# Patient Record
Sex: FEMALE | Race: WHITE | NOT HISPANIC OR LATINO | Employment: FULL TIME | URBAN - NONMETROPOLITAN AREA
[De-identification: names, ages, dates, MRNs, and addresses within clinical notes are randomized per-mention and may not be internally consistent; named-entity substitution may affect disease eponyms.]

---

## 2022-01-05 ENCOUNTER — HOSPITAL ENCOUNTER (EMERGENCY)
Facility: HOSPITAL | Age: 42
End: 2022-01-05

## 2022-01-05 ENCOUNTER — HOSPITAL ENCOUNTER (OUTPATIENT)
Facility: HOSPITAL | Age: 42
Discharge: HOME | End: 2022-01-06
Attending: OTOLARYNGOLOGY | Admitting: OTOLARYNGOLOGY

## 2022-01-05 ENCOUNTER — ANESTHESIA (OUTPATIENT)
Dept: PERIOP | Facility: HOSPITAL | Age: 42
End: 2022-01-05

## 2022-01-05 ENCOUNTER — ANESTHESIA EVENT (OUTPATIENT)
Dept: PERIOP | Facility: HOSPITAL | Age: 42
End: 2022-01-05

## 2022-01-05 ENCOUNTER — OFFICE VISIT (OUTPATIENT)
Dept: OTOLARYNGOLOGY | Facility: CLINIC | Age: 42
End: 2022-01-05

## 2022-01-05 VITALS
DIASTOLIC BLOOD PRESSURE: 88 MMHG | SYSTOLIC BLOOD PRESSURE: 162 MMHG | TEMPERATURE: 98.2 F | WEIGHT: 144 LBS | HEART RATE: 112 BPM

## 2022-01-05 DIAGNOSIS — J03.90 ACUTE TONSILLITIS, UNSPECIFIED ETIOLOGY: ICD-10-CM

## 2022-01-05 DIAGNOSIS — Z90.89 S/P TONSILLECTOMY: Primary | ICD-10-CM

## 2022-01-05 DIAGNOSIS — J98.8 ANATOMIC AIRWAY OBSTRUCTION: ICD-10-CM

## 2022-01-05 DIAGNOSIS — J36 PERITONSILLAR ABSCESS: Primary | ICD-10-CM

## 2022-01-05 DIAGNOSIS — Z87.891 PERSONAL HISTORY OF NICOTINE DEPENDENCE: ICD-10-CM

## 2022-01-05 DIAGNOSIS — J36 PERITONSILLAR ABSCESS: ICD-10-CM

## 2022-01-05 LAB
B-HCG UR QL: NEGATIVE
GLUCOSE BLDC GLUCOMTR-MCNC: 204 MG/DL (ref 70–130)
GLUCOSE BLDC GLUCOMTR-MCNC: 235 MG/DL (ref 70–130)
GLUCOSE BLDC GLUCOMTR-MCNC: 248 MG/DL (ref 70–130)

## 2022-01-05 PROCEDURE — 25010000002 DEXAMETHASONE PER 1 MG: Performed by: NURSE ANESTHETIST, CERTIFIED REGISTERED

## 2022-01-05 PROCEDURE — 82962 GLUCOSE BLOOD TEST: CPT

## 2022-01-05 PROCEDURE — 25010000002 DEXAMETHASONE PER 1 MG: Performed by: OTOLARYNGOLOGY

## 2022-01-05 PROCEDURE — 99234 HOSP IP/OBS SM DT SF/LOW 45: CPT | Performed by: OTOLARYNGOLOGY

## 2022-01-05 PROCEDURE — 88304 TISSUE EXAM BY PATHOLOGIST: CPT | Performed by: OTOLARYNGOLOGY

## 2022-01-05 PROCEDURE — G0378 HOSPITAL OBSERVATION PER HR: HCPCS

## 2022-01-05 PROCEDURE — 42821 REMOVE TONSILS AND ADENOIDS: CPT | Performed by: OTOLARYNGOLOGY

## 2022-01-05 PROCEDURE — 81025 URINE PREGNANCY TEST: CPT | Performed by: OTOLARYNGOLOGY

## 2022-01-05 PROCEDURE — 87070 CULTURE OTHR SPECIMN AEROBIC: CPT | Performed by: OTOLARYNGOLOGY

## 2022-01-05 PROCEDURE — 87147 CULTURE TYPE IMMUNOLOGIC: CPT | Performed by: OTOLARYNGOLOGY

## 2022-01-05 PROCEDURE — 42700 I&D ABSCESS PERITONSILLAR: CPT | Performed by: OTOLARYNGOLOGY

## 2022-01-05 PROCEDURE — 25010000002 ONDANSETRON PER 1 MG: Performed by: NURSE ANESTHETIST, CERTIFIED REGISTERED

## 2022-01-05 PROCEDURE — 0 CEFAZOLIN PER 500 MG: Performed by: NURSE ANESTHETIST, CERTIFIED REGISTERED

## 2022-01-05 PROCEDURE — 25010000002 PROPOFOL 10 MG/ML EMULSION: Performed by: NURSE ANESTHETIST, CERTIFIED REGISTERED

## 2022-01-05 PROCEDURE — 25010000002 FENTANYL CITRATE (PF) 250 MCG/5ML SOLUTION: Performed by: NURSE ANESTHETIST, CERTIFIED REGISTERED

## 2022-01-05 PROCEDURE — 87205 SMEAR GRAM STAIN: CPT | Performed by: OTOLARYNGOLOGY

## 2022-01-05 RX ORDER — ONDANSETRON 2 MG/ML
4 INJECTION INTRAMUSCULAR; INTRAVENOUS ONCE AS NEEDED
Status: DISCONTINUED | OUTPATIENT
Start: 2022-01-05 | End: 2022-01-06 | Stop reason: HOSPADM

## 2022-01-05 RX ORDER — IBUPROFEN 600 MG/1
600 TABLET ORAL ONCE AS NEEDED
Status: DISCONTINUED | OUTPATIENT
Start: 2022-01-05 | End: 2022-01-05 | Stop reason: HOSPADM

## 2022-01-05 RX ORDER — ECHINACEA PURPUREA EXTRACT 125 MG
1 TABLET ORAL AS NEEDED
COMMUNITY

## 2022-01-05 RX ORDER — SODIUM CHLORIDE 0.9 % (FLUSH) 0.9 %
10 SYRINGE (ML) INJECTION AS NEEDED
Status: DISCONTINUED | OUTPATIENT
Start: 2022-01-05 | End: 2022-01-05 | Stop reason: HOSPADM

## 2022-01-05 RX ORDER — ECHINACEA PURPUREA EXTRACT 125 MG
2 TABLET ORAL CONTINUOUS PRN
Status: DISCONTINUED | OUTPATIENT
Start: 2022-01-05 | End: 2022-01-06 | Stop reason: HOSPADM

## 2022-01-05 RX ORDER — CLINDAMYCIN PHOSPHATE 600 MG/50ML
600 INJECTION INTRAVENOUS EVERY 8 HOURS
Status: DISCONTINUED | OUTPATIENT
Start: 2022-01-05 | End: 2022-01-06 | Stop reason: HOSPADM

## 2022-01-05 RX ORDER — PROPOFOL 10 MG/ML
VIAL (ML) INTRAVENOUS AS NEEDED
Status: DISCONTINUED | OUTPATIENT
Start: 2022-01-05 | End: 2022-01-05 | Stop reason: SURG

## 2022-01-05 RX ORDER — NALOXONE HCL 0.4 MG/ML
0.04 VIAL (ML) INJECTION AS NEEDED
Status: DISCONTINUED | OUTPATIENT
Start: 2022-01-05 | End: 2022-01-05 | Stop reason: HOSPADM

## 2022-01-05 RX ORDER — OXYMETAZOLINE HYDROCHLORIDE 0.05 G/100ML
2 SPRAY NASAL 3 TIMES DAILY
Status: DISCONTINUED | OUTPATIENT
Start: 2022-01-05 | End: 2022-01-06 | Stop reason: HOSPADM

## 2022-01-05 RX ORDER — DEXTROSE, SODIUM CHLORIDE, AND POTASSIUM CHLORIDE 5; .45; .15 G/100ML; G/100ML; G/100ML
100 INJECTION INTRAVENOUS CONTINUOUS
Status: DISCONTINUED | OUTPATIENT
Start: 2022-01-05 | End: 2022-01-06 | Stop reason: HOSPADM

## 2022-01-05 RX ORDER — FLUMAZENIL 0.1 MG/ML
0.2 INJECTION INTRAVENOUS AS NEEDED
Status: DISCONTINUED | OUTPATIENT
Start: 2022-01-05 | End: 2022-01-05 | Stop reason: HOSPADM

## 2022-01-05 RX ORDER — SODIUM CHLORIDE, SODIUM LACTATE, POTASSIUM CHLORIDE, CALCIUM CHLORIDE 600; 310; 30; 20 MG/100ML; MG/100ML; MG/100ML; MG/100ML
9 INJECTION, SOLUTION INTRAVENOUS CONTINUOUS
Status: DISCONTINUED | OUTPATIENT
Start: 2022-01-05 | End: 2022-01-05 | Stop reason: HOSPADM

## 2022-01-05 RX ORDER — MIDAZOLAM HYDROCHLORIDE 1 MG/ML
1 INJECTION INTRAMUSCULAR; INTRAVENOUS
Status: DISCONTINUED | OUTPATIENT
Start: 2022-01-05 | End: 2022-01-05 | Stop reason: HOSPADM

## 2022-01-05 RX ORDER — METOPROLOL TARTRATE 5 MG/5ML
INJECTION INTRAVENOUS AS NEEDED
Status: DISCONTINUED | OUTPATIENT
Start: 2022-01-05 | End: 2022-01-05 | Stop reason: SURG

## 2022-01-05 RX ORDER — ONDANSETRON 2 MG/ML
INJECTION INTRAMUSCULAR; INTRAVENOUS AS NEEDED
Status: DISCONTINUED | OUTPATIENT
Start: 2022-01-05 | End: 2022-01-05 | Stop reason: SURG

## 2022-01-05 RX ORDER — OXYCODONE HCL 5 MG/5 ML
5 SOLUTION, ORAL ORAL
Status: DISCONTINUED | OUTPATIENT
Start: 2022-01-05 | End: 2022-01-06 | Stop reason: HOSPADM

## 2022-01-05 RX ORDER — LIDOCAINE HYDROCHLORIDE 10 MG/ML
0.5 INJECTION, SOLUTION EPIDURAL; INFILTRATION; INTRACAUDAL; PERINEURAL ONCE AS NEEDED
Status: DISCONTINUED | OUTPATIENT
Start: 2022-01-05 | End: 2022-01-05 | Stop reason: HOSPADM

## 2022-01-05 RX ORDER — LABETALOL HYDROCHLORIDE 5 MG/ML
5 INJECTION, SOLUTION INTRAVENOUS
Status: DISCONTINUED | OUTPATIENT
Start: 2022-01-05 | End: 2022-01-05 | Stop reason: HOSPADM

## 2022-01-05 RX ORDER — SODIUM CHLORIDE 0.9 % (FLUSH) 0.9 %
3 SYRINGE (ML) INJECTION AS NEEDED
Status: DISCONTINUED | OUTPATIENT
Start: 2022-01-05 | End: 2022-01-05 | Stop reason: HOSPADM

## 2022-01-05 RX ORDER — NEOSTIGMINE METHYLSULFATE 5 MG/5 ML
SYRINGE (ML) INTRAVENOUS AS NEEDED
Status: DISCONTINUED | OUTPATIENT
Start: 2022-01-05 | End: 2022-01-05 | Stop reason: SURG

## 2022-01-05 RX ORDER — DEXTROSE MONOHYDRATE 25 G/50ML
12.5 INJECTION, SOLUTION INTRAVENOUS AS NEEDED
Status: DISCONTINUED | OUTPATIENT
Start: 2022-01-05 | End: 2022-01-05 | Stop reason: HOSPADM

## 2022-01-05 RX ORDER — MORPHINE SULFATE 2 MG/ML
1 INJECTION, SOLUTION INTRAMUSCULAR; INTRAVENOUS
Status: DISCONTINUED | OUTPATIENT
Start: 2022-01-05 | End: 2022-01-06 | Stop reason: HOSPADM

## 2022-01-05 RX ORDER — ACETAMINOPHEN 325 MG/1
325 TABLET ORAL EVERY 4 HOURS PRN
Status: DISCONTINUED | OUTPATIENT
Start: 2022-01-05 | End: 2022-01-06 | Stop reason: HOSPADM

## 2022-01-05 RX ORDER — FENTANYL CITRATE 50 UG/ML
INJECTION, SOLUTION INTRAMUSCULAR; INTRAVENOUS AS NEEDED
Status: DISCONTINUED | OUTPATIENT
Start: 2022-01-05 | End: 2022-01-05

## 2022-01-05 RX ORDER — SODIUM CHLORIDE 9 MG/ML
INJECTION, SOLUTION INTRAVENOUS AS NEEDED
Status: DISCONTINUED | OUTPATIENT
Start: 2022-01-05 | End: 2022-01-05 | Stop reason: HOSPADM

## 2022-01-05 RX ORDER — FENTANYL CITRATE 50 UG/ML
25 INJECTION, SOLUTION INTRAMUSCULAR; INTRAVENOUS
Status: DISCONTINUED | OUTPATIENT
Start: 2022-01-05 | End: 2022-01-05 | Stop reason: HOSPADM

## 2022-01-05 RX ORDER — CEFAZOLIN SODIUM 1 G/3ML
INJECTION, POWDER, FOR SOLUTION INTRAMUSCULAR; INTRAVENOUS AS NEEDED
Status: DISCONTINUED | OUTPATIENT
Start: 2022-01-05 | End: 2022-01-05 | Stop reason: SURG

## 2022-01-05 RX ORDER — LIDOCAINE HYDROCHLORIDE 40 MG/ML
SOLUTION TOPICAL AS NEEDED
Status: DISCONTINUED | OUTPATIENT
Start: 2022-01-05 | End: 2022-01-05 | Stop reason: SURG

## 2022-01-05 RX ORDER — ACETAMINOPHEN 160 MG/5ML
650 SOLUTION ORAL EVERY 4 HOURS PRN
Status: DISCONTINUED | OUTPATIENT
Start: 2022-01-05 | End: 2022-01-06 | Stop reason: HOSPADM

## 2022-01-05 RX ORDER — DEXAMETHASONE SODIUM PHOSPHATE 4 MG/ML
8 INJECTION, SOLUTION INTRA-ARTICULAR; INTRALESIONAL; INTRAMUSCULAR; INTRAVENOUS; SOFT TISSUE EVERY 6 HOURS
Status: DISCONTINUED | OUTPATIENT
Start: 2022-01-05 | End: 2022-01-06 | Stop reason: HOSPADM

## 2022-01-05 RX ORDER — SODIUM CHLORIDE 0.9 % (FLUSH) 0.9 %
10 SYRINGE (ML) INJECTION EVERY 12 HOURS SCHEDULED
Status: DISCONTINUED | OUTPATIENT
Start: 2022-01-05 | End: 2022-01-05 | Stop reason: HOSPADM

## 2022-01-05 RX ORDER — OXYCODONE AND ACETAMINOPHEN 10; 325 MG/1; MG/1
1 TABLET ORAL ONCE AS NEEDED
Status: DISCONTINUED | OUTPATIENT
Start: 2022-01-05 | End: 2022-01-05 | Stop reason: HOSPADM

## 2022-01-05 RX ORDER — VECURONIUM BROMIDE 1 MG/ML
INJECTION, POWDER, LYOPHILIZED, FOR SOLUTION INTRAVENOUS AS NEEDED
Status: DISCONTINUED | OUTPATIENT
Start: 2022-01-05 | End: 2022-01-05 | Stop reason: SURG

## 2022-01-05 RX ORDER — SODIUM CHLORIDE, SODIUM LACTATE, POTASSIUM CHLORIDE, CALCIUM CHLORIDE 600; 310; 30; 20 MG/100ML; MG/100ML; MG/100ML; MG/100ML
1000 INJECTION, SOLUTION INTRAVENOUS CONTINUOUS
Status: DISCONTINUED | OUTPATIENT
Start: 2022-01-05 | End: 2022-01-05 | Stop reason: HOSPADM

## 2022-01-05 RX ORDER — D-METHORPHAN/PE/ACETAMINOPHEN 10-5-325MG
CAPSULE ORAL
COMMUNITY

## 2022-01-05 RX ORDER — HYDROMORPHONE HYDROCHLORIDE 1 MG/ML
0.5 INJECTION, SOLUTION INTRAMUSCULAR; INTRAVENOUS; SUBCUTANEOUS
Status: DISCONTINUED | OUTPATIENT
Start: 2022-01-05 | End: 2022-01-05 | Stop reason: HOSPADM

## 2022-01-05 RX ORDER — DEXAMETHASONE SODIUM PHOSPHATE 4 MG/ML
INJECTION, SOLUTION INTRA-ARTICULAR; INTRALESIONAL; INTRAMUSCULAR; INTRAVENOUS; SOFT TISSUE AS NEEDED
Status: DISCONTINUED | OUTPATIENT
Start: 2022-01-05 | End: 2022-01-05 | Stop reason: SURG

## 2022-01-05 RX ORDER — LIDOCAINE HYDROCHLORIDE 20 MG/ML
INJECTION, SOLUTION EPIDURAL; INFILTRATION; INTRACAUDAL; PERINEURAL AS NEEDED
Status: DISCONTINUED | OUTPATIENT
Start: 2022-01-05 | End: 2022-01-05 | Stop reason: SURG

## 2022-01-05 RX ORDER — ACETAMINOPHEN 650 MG/1
650 SUPPOSITORY RECTAL EVERY 4 HOURS PRN
Status: DISCONTINUED | OUTPATIENT
Start: 2022-01-05 | End: 2022-01-06 | Stop reason: HOSPADM

## 2022-01-05 RX ORDER — ECHINACEA PURPUREA EXTRACT 125 MG
2 TABLET ORAL
Status: DISCONTINUED | OUTPATIENT
Start: 2022-01-05 | End: 2022-01-06 | Stop reason: HOSPADM

## 2022-01-05 RX ORDER — ONDANSETRON 2 MG/ML
4 INJECTION INTRAMUSCULAR; INTRAVENOUS AS NEEDED
Status: DISCONTINUED | OUTPATIENT
Start: 2022-01-05 | End: 2022-01-05 | Stop reason: HOSPADM

## 2022-01-05 RX ORDER — NAPROXEN SODIUM 220 MG
220 TABLET ORAL 2 TIMES DAILY PRN
COMMUNITY

## 2022-01-05 RX ORDER — FENTANYL CITRATE 50 UG/ML
INJECTION, SOLUTION INTRAMUSCULAR; INTRAVENOUS AS NEEDED
Status: DISCONTINUED | OUTPATIENT
Start: 2022-01-05 | End: 2022-01-05 | Stop reason: SURG

## 2022-01-05 RX ADMIN — POTASSIUM CHLORIDE, DEXTROSE MONOHYDRATE AND SODIUM CHLORIDE 100 ML/HR: 150; 5; 450 INJECTION, SOLUTION INTRAVENOUS at 20:30

## 2022-01-05 RX ADMIN — CLINDAMYCIN IN 5 PERCENT DEXTROSE 600 MG: 12 INJECTION, SOLUTION INTRAVENOUS at 20:38

## 2022-01-05 RX ADMIN — METOPROLOL TARTRATE 2.5 MG: 1 INJECTION, SOLUTION INTRAVENOUS at 16:51

## 2022-01-05 RX ADMIN — DEXAMETHASONE SODIUM PHOSPHATE 10 MG: 4 INJECTION, SOLUTION INTRA-ARTICULAR; INTRALESIONAL; INTRAMUSCULAR; INTRAVENOUS; SOFT TISSUE at 16:16

## 2022-01-05 RX ADMIN — CEFAZOLIN 2 G: 330 INJECTION, POWDER, FOR SOLUTION INTRAMUSCULAR; INTRAVENOUS at 16:15

## 2022-01-05 RX ADMIN — VECURONIUM BROMIDE 5 MG: 1 INJECTION, POWDER, LYOPHILIZED, FOR SOLUTION INTRAVENOUS at 16:13

## 2022-01-05 RX ADMIN — LIDOCAINE HYDROCHLORIDE 2 EACH: 40 SOLUTION TOPICAL at 16:13

## 2022-01-05 RX ADMIN — SODIUM CHLORIDE, POTASSIUM CHLORIDE, SODIUM LACTATE AND CALCIUM CHLORIDE 1000 ML: 600; 310; 30; 20 INJECTION, SOLUTION INTRAVENOUS at 15:35

## 2022-01-05 RX ADMIN — SALINE NASAL SPRAY 2 SPRAY: 1.5 SOLUTION NASAL at 21:18

## 2022-01-05 RX ADMIN — Medication 3.5 MG: at 16:54

## 2022-01-05 RX ADMIN — LIDOCAINE HYDROCHLORIDE 200 MG: 20 INJECTION, SOLUTION EPIDURAL; INFILTRATION; INTRACAUDAL; PERINEURAL at 16:13

## 2022-01-05 RX ADMIN — DEXAMETHASONE SODIUM PHOSPHATE 8 MG: 4 INJECTION, SOLUTION INTRA-ARTICULAR; INTRALESIONAL; INTRAMUSCULAR; INTRAVENOUS; SOFT TISSUE at 22:26

## 2022-01-05 RX ADMIN — GLYCOPYRROLATE 0.4 MG: 0.2 INJECTION, SOLUTION INTRAMUSCULAR; INTRAVENOUS at 16:54

## 2022-01-05 RX ADMIN — ONDANSETRON 8 MG: 2 INJECTION INTRAMUSCULAR; INTRAVENOUS at 16:16

## 2022-01-05 RX ADMIN — OXYMETAZOLINE HYDROCHLORIDE 2 SPRAY: 5 SPRAY NASAL at 21:18

## 2022-01-05 RX ADMIN — PROPOFOL 200 MG: 10 INJECTION, EMULSION INTRAVENOUS at 16:13

## 2022-01-05 RX ADMIN — FENTANYL CITRATE 250 MCG: 50 INJECTION, SOLUTION INTRAMUSCULAR; INTRAVENOUS at 16:13

## 2022-01-05 NOTE — BRIEF OP NOTE
[facility] Otolaryngology Head and Neck Surgery  OPERATIVE NOTE  Milagros Daniels  1/5/2022    Pre-op Diagnosis:   Peritonsillar abscess [J36]  Acute tonsillitis, unspecified etiology [J03.90]  Anatomic airway obstruction [J98.8]    Post-op Diagnosis:     Post-Op Diagnosis Codes:     * Peritonsillar abscess [J36]     * Acute tonsillitis, unspecified etiology [J03.90]     * Anatomic airway obstruction [J98.8]    Procedure/CPT® Codes:  Procedure(s):  PERITONSILLAR ABSCESS INCISION AND DRAINAGE  TONSILLECTOMY AND ADENOIDECTOMY    Surgeon(s):  Henry Austin Jr., MD    Anesthesia:   General    Staff:   Circulator: Andrew Tiwari RN  Scrub Person: Em Pantoja    Estimated Blood Loss:   10 cc    Specimens:                Tonsils     Drains:  none    Findings:   Tonsils: Left side 2+ right side 4+, Adenoids: 1+  Peritonsillar abscess present in the lower pole, infiltrating the lateral pharyngeal wall musculature    Complications:   none    Reason for the Operation:  Milagros Daniels is a 41 y.o. female who has had a history of peritonsillar abscess. A tonsillectomy and adenoidectomy were recommended. The risks and benefits were explained including but not limited to early and late bleeding, infection, risks of the general anesthesia, dysphagia and poor PO intake, and voice change/VPI.  Alternatives were discussed. Questions were asked appropriately answered.      Procedure Description:  Incision and drainage of peritonsillar abscess:  The patient was taken back to the operating room, placed supine on the operating table and placed under anesthesia by the anesthesia staff. Once this was done a time out was performed to confirm the patient and the proper procedure.  The head was positioned.  A Ethan-Angelo mouth gag inserted and opened to its widest extent.   Dissection was begun at right superior tonsillar pole, and extended inferiorly.  The tonsillar capsule was dissected but no  pus was identified.  The incision was carried down to the mid and inferior tonsillar pole.  The tonsil was reflected medially.  Posterior to the lower third of the tonsillar pole, abscess was encountered.  This was immediately cultured.  The entire abscess was irrigated and drained.  The palate was examined and no submucous cleft palate noted.  Tonsillectomy:  Medtronic Plasma Generator   Settings- 4/5   A red rubber catheter was placed through the nares and brought out through the mouth to retract the palate.  Using meticulous dissection in the capsular plane the left and right tonsils were removed. Hemostasis was obtained.   Final hemostasis was obtained with the Medtronic plasma generator.    The Angelo Ethan gag was released, allowed to stand and replaced. Hemostasis was found to be satisfactory.  The procedure was terminated.The adenoids were removed under indirect mirror visualization. Adequate hemostasis was assured prior to removing palate retraction.    The oropharynx and oral cavity were irrigated clean.  Hemostasis was satisfactory.     The operative site was inspected for retained foreign bodies and instruments.   Sponge/needle count was Correct  Hemostasis was satisfactory.  The patient was then turned over to the anesthesia team and allowed to wake from anesthesia.     Disposition: The patient was transported to the PACU in Good condition.   Patient will be placed in observation following surgery for additional care.    Postoperative discussion held with: No one present at end of surgery  DVT ASSESSMENT CARRIED OUT: Patient is in the immediate post-operative period and is not a candidate for anticoagulation therapy  Patient is at increased risk for bleeding if anticoagulant therapy is used    Henry Austin Jr, MD  1/5/2022  17:05 CST     Urine Voided: * No values recorded between 1/5/2022  4:11 PM and 1/5/2022  4:58 PM *    Specimens:                Specimens     ID Source Type Tests Collected By  Collected At Frozen?    1 Tonsil, Right Wound · WOUND CULTURE   Henry Austin Jr., MD 1/5/22 1632     Description: gram stain culture    A Tonsil, Right Tissue · TISSUE PATHOLOGY EXAM   Henry Austin Jr., MD 1/5/22 1640     Description: pin in right    This specimen was not marked as sent.    B Tonsil, Left Tissue · TISSUE PATHOLOGY EXAM   Henry Austin Jr., MD 1/5/22 1640     This specimen was not marked as sent.

## 2022-01-05 NOTE — ANESTHESIA POSTPROCEDURE EVALUATION
"Patient: Milagros Daniels    Procedure Summary     Date: 01/05/22 Room / Location:  [facility] OR 03 /  [facility] OR    Anesthesia Start: 1611 Anesthesia Stop: 1706    Procedures:       PERITONSILLAR ABSCESS INCISION AND DRAINAGE (Right Throat)      TONSILLECTOMY AND ADENOIDECTOMY (Bilateral Throat) Diagnosis:       Peritonsillar abscess      Acute tonsillitis, unspecified etiology      Anatomic airway obstruction      (Peritonsillar abscess [J36])      (Acute tonsillitis, unspecified etiology [J03.90])      (Anatomic airway obstruction [J98.8])    Surgeons: Henry Austin Jr., MD Provider: Roque Holly CRNA    Anesthesia Type: general ASA Status: 2 - Emergent          Anesthesia Type: general    Vitals  No vitals data found for the desired time range.          Post Anesthesia Care and Evaluation    Patient location during evaluation: PACU  Patient participation: complete - patient participated  Level of consciousness: awake and alert  Pain management: adequate  Airway patency: patent  Anesthetic complications: No anesthetic complications    Cardiovascular status: acceptable  Respiratory status: acceptable  Hydration status: acceptable    Comments: Blood pressure 145/83, pulse 116, temperature 100.1 °F (37.8 °C), temperature source Temporal, resp. rate 20, height 156 cm (61.42\"), weight 65.4 kg (144 lb 2.9 oz), last menstrual period 01/05/2022, SpO2 99 %, not currently breastfeeding.    Pt discharged from PACU based on abiel score >8      "

## 2022-01-05 NOTE — PATIENT INSTRUCTIONS
CONTACT INFORMATION:  The main office phone number is [phone]. For emergencies after hours and on weekends, this number will convert over to our answering service and the on call provider will answer. Please try to keep non emergent phone calls/ questions to office hours 9am-5pm Monday through Friday.     [employer]  As an alternative, you can sign up and use the Epic MyChart system for more direct and quicker access for non emergent questions/ problems.  [employer] allows you to send messages to your doctor, view your test results, renew your prescriptions, schedule appointments, and more. To sign up, go to [employer] and click on the Sign Up Now link in the New User? box. Enter your [employer] Activation Code exactly as it appears below along with the last four digits of your Social Security Number and your Date of Birth () to complete the sign-up process. If you do not sign up before the expiration date, you must request a new code.    [employer] Activation Code: [email or web address]  Expires: 2022 12:34 PM    If you have questions, you can email [email or web address] or call [phone] to talk to our [employer] staff. Remember, [employer] is NOT to be used for urgent needs. For medical emergencies, dial 911.    IF YOU SMOKE OR USE TOBACCO PLEASE READ THE FOLLOWING:  Why is smoking bad for me?  Smoking increases the risk of heart disease, lung disease, vascular disease, stroke, and cancer. If you smoke, STOP!      IF YOU SMOKE OR USE TOBACCO PLEASE READ THE FOLLOWING:  Why is smoking bad for me?  Smoking increases the risk of heart disease, lung disease, vascular disease, stroke, and cancer. If you smoke, STOP!    For more information:  [facility]  -QUIT-NOW  [email or web address]

## 2022-01-05 NOTE — OP NOTE
[facility] Otolaryngology Head and Neck Surgery  OPERATIVE NOTE  Milagros Daniels  1/5/2022    Pre-op Diagnosis:   Peritonsillar abscess [J36]  Acute tonsillitis, unspecified etiology [J03.90]  Anatomic airway obstruction [J98.8]    Post-op Diagnosis:     Post-Op Diagnosis Codes:     * Peritonsillar abscess [J36]     * Acute tonsillitis, unspecified etiology [J03.90]     * Anatomic airway obstruction [J98.8]    Procedure/CPT® Codes:  Procedure(s):  PERITONSILLAR ABSCESS INCISION AND DRAINAGE  TONSILLECTOMY AND ADENOIDECTOMY    Surgeon(s):  Henry Austin Jr., MD    Anesthesia:   General    Staff:   Circulator: Andrew Tiwari RN  Scrub Person: Em Pantoja    Estimated Blood Loss:   10 cc    Specimens:                Tonsils     Drains:  none    Findings:   Tonsils: Left side 2+ right side 4+, Adenoids: 1+  Peritonsillar abscess present in the lower pole, infiltrating the lateral pharyngeal wall musculature    Complications:   none    Reason for the Operation:  Milagros Daniels is a 41 y.o. female who has had a history of peritonsillar abscess. A tonsillectomy and adenoidectomy were recommended. The risks and benefits were explained including but not limited to early and late bleeding, infection, risks of the general anesthesia, dysphagia and poor PO intake, and voice change/VPI.  Alternatives were discussed. Questions were asked appropriately answered.      Procedure Description:  Incision and drainage of peritonsillar abscess:  The patient was taken back to the operating room, placed supine on the operating table and placed under anesthesia by the anesthesia staff. Once this was done a time out was performed to confirm the patient and the proper procedure.  The head was positioned.  A Ethan-Angelo mouth gag inserted and opened to its widest extent.   Dissection was begun at right superior tonsillar pole, and extended inferiorly.  The tonsillar capsule was dissected but no  pus was identified.  The incision was carried down to the mid and inferior tonsillar pole.  The tonsil was reflected medially.  Posterior to the lower third of the tonsillar pole, abscess was encountered.  This was immediately cultured.  The entire abscess was irrigated and drained.  The palate was examined and no submucous cleft palate noted.  Tonsillectomy:  Medtronic Plasma Generator   Settings- 4/5   A red rubber catheter was placed through the nares and brought out through the mouth to retract the palate.  Using meticulous dissection in the capsular plane the left and right tonsils were removed. Hemostasis was obtained.   Final hemostasis was obtained with the Medtronic plasma generator.    The Angelo Ethan gag was released, allowed to stand and replaced. Hemostasis was found to be satisfactory.  The procedure was terminated.The adenoids were removed under indirect mirror visualization. Adequate hemostasis was assured prior to removing palate retraction.    The oropharynx and oral cavity were irrigated clean.  Hemostasis was satisfactory.     The operative site was inspected for retained foreign bodies and instruments.   Sponge/needle count was Correct  Hemostasis was satisfactory.  The patient was then turned over to the anesthesia team and allowed to wake from anesthesia.     Disposition: The patient was transported to the PACU in Good condition.   Patient will be placed in observation following surgery for additional care.    Postoperative discussion held with: No one present at end of surgery  DVT ASSESSMENT CARRIED OUT: Patient is in the immediate post-operative period and is not a candidate for anticoagulation therapy  Patient is at increased risk for bleeding if anticoagulant therapy is used    Henry Austin Jr, MD  1/5/2022  17:05 CST     Urine Voided: * No values recorded between 1/5/2022  4:11 PM and 1/5/2022  4:58 PM *    Specimens:                Specimens     ID Source Type Tests Collected By  Collected At Frozen?    1 Tonsil, Right Wound · WOUND CULTURE   Henry Austin Jr., MD 1/5/22 1632     Description: gram stain culture    A Tonsil, Right Tissue · TISSUE PATHOLOGY EXAM   Henry Austin Jr., MD 1/5/22 1640     Description: pin in right    This specimen was not marked as sent.    B Tonsil, Left Tissue · TISSUE PATHOLOGY EXAM   Henry Austin Jr., MD 1/5/22 1640     This specimen was not marked as sent.

## 2022-01-05 NOTE — ANESTHESIA PROCEDURE NOTES
Airway  Urgency: elective    Date/Time: 1/5/2022 4:13 PM  Airway not difficult    General Information and Staff    Patient location during procedure: OR  CRNA: Roque Holly CRNA    Indications and Patient Condition  Indications for airway management: airway protection    Preoxygenated: yes  MILS maintained throughout  Mask difficulty assessment: 1 - vent by mask    Final Airway Details  Final airway type: endotracheal airway      Successful airway: ETT  Cuffed: yes   Successful intubation technique: direct laryngoscopy  Endotracheal tube insertion site: oral  Blade: Ray  Blade size: 2  ETT size (mm): 7.0  Cormack-Lehane Classification: grade I - full view of glottis  Placement verified by: chest auscultation and capnometry   Cuff volume (mL): 5  Measured from: lips  ETT/EBT  to lips (cm): 20  Number of attempts at approach: 1  Assessment: lips, teeth, and gum same as pre-op and atraumatic intubation

## 2022-01-05 NOTE — PROGRESS NOTES
Henry Austin Jr, MD  [facility] EAR NOSE & THROAT  [address], [address]  [place] [address]  Fax [phone]  Phone [phone]      Visit Type: NEW PATIENT   Chief Complaint   Patient presents with   • peritonsilar abscess        HPI   Accompanied by:  No ne    No surgery found, No surgery found   She complains of throat sweeling. Has been referred for PTA from [place].  R sided throat pain. R ear karoline. R headache. She says MD looked at PTA and was told PTA.  She cannot eat. When she swallows, she says comes out nose.  Seen in [facility] earlier this AM.  Smoke- 1/2 ppd, not quitting, x 30 yrs  Drink- none  Has had a history of strep throat              Vital Signs:   Temp:  [98.2 °F (36.8 °C)] 98.2 °F (36.8 °C)  Heart Rate:  [112] 112  BP: (162)/(88) 162/88  ENT Physical Exam  Constitutional  Appearance: patient appears well-developed and well-nourished,  Communication/Voice: Communication comments: Hot potato  Head and Face  Appearance: head appears normal, face appears normal and face appears atraumatic;  Salivary: glands normal;  Head and Face comments: Tender R face into scalp and neck  Ear  Hearing: intact;  Auricles: right auricle normal; left auricle normal;  External Mastoids: right external mastoid normal; left external mastoid normal;  Ear Canals: right ear canal normal; left ear canal normal;  Tympanic Membranes: right tympanic membrane normal; left tympanic membrane normal;  Nose  External Nose: nares patent bilaterally; external nose normal;  Internal Nose: nasal mucosa normal; septum normal; bilateral inferior turbinates normal;  Oral Cavity/Oropharynx  Lips: normal;  Teeth: trismus (moderate to severe) noted;  Gums: gingiva normal;  Tongue: normal;  Oral mucosa: normal;  Hard palate: normal;  Soft palate: with mass (Bulging R side) present;  OC/OP comments: Trismus present  Neck  Thyroid: thyroid normal;  Neck comments: Tender R neck  at angle area and submandibular  Respiratory  Inspection: breathing unlabored; normal breathing rate;  Auscultation: breath sounds are clear;  Cardiovascular  Inspection: extremities are warm and well perfused; no peripheral edema present;  Neurovestibular  Mental Status: alert and oriented;  Psychiatric: mood normal; affect is appropriate;  Cranial Nerves: cranial nerves intact;  Drawings           Result Review    RESULTS REVIEW    I have reviewed the patients old records in the chart.   I have reviewed the patients old records in the chart.  The following results/records were reviewed:  I reviewed the patient's new imaging results and agree with the interpretation.   Referral to Otolaryngology for Peritonsillar abscess (01/05/2022)  EXTERNAL MEDICAL RECORDS - SCAN - EXT MED REC 2_FERNANDO PINTO MD_1/5/22 (01/05/2022)  EXTERNAL MEDICAL RECORDS - SCAN - EXT MED REC_FERNANDO PINTO MD_1/5/22 (01/05/2022)        Assessment/Plan    Diagnoses and all orders for this visit:    1. Peritonsillar abscess (Primary)  Comments:  right    2. Acute tonsillitis, unspecified etiology  Comments:  R>L    3. Personal history of nicotine dependence  Comments:  Counseled cessation    4. Anatomic airway obstruction  Comments:  R tonsil        Medical and surgical options were discussed including observation, continued medical management, medication modification, surgical management and admission to hospital. Risks, benefits and alternatives were discussed and questions were answered. After considering the options, the patient decided to proceed with admission to hospital, IV steroids, pain control, tonsillectomy.     Patient has right peritonsillar abscess.  I will admit her to the hospital for IV hydration, resuscitation, IV steroids, IV antibiotics.  I will plan incision and drainage in the next few hours if she does not defervesce.  She may require tonsillectomy.  Admission to hospital  IV resuscitation    Encouraged to enroll in My  Chart  Encouraged to review data and findings in My Chart    Patient understand(s) and agree(s) with the treatment plan as described.      Return Return to clinic after hospitalization, for Recheck tonsils.      Henry Austin Jr, MD  01/05/22  13:44 CST

## 2022-01-06 VITALS
HEIGHT: 61 IN | TEMPERATURE: 98.1 F | BODY MASS INDEX: 27.22 KG/M2 | WEIGHT: 144.18 LBS | HEART RATE: 100 BPM | SYSTOLIC BLOOD PRESSURE: 115 MMHG | OXYGEN SATURATION: 100 % | RESPIRATION RATE: 16 BRPM | DIASTOLIC BLOOD PRESSURE: 50 MMHG

## 2022-01-06 PROCEDURE — 25010000002 DEXAMETHASONE PER 1 MG: Performed by: OTOLARYNGOLOGY

## 2022-01-06 PROCEDURE — 99024 POSTOP FOLLOW-UP VISIT: CPT | Performed by: OTOLARYNGOLOGY

## 2022-01-06 RX ORDER — OXYCODONE HCL 5 MG/5 ML
5 SOLUTION, ORAL ORAL EVERY 4 HOURS PRN
Qty: 120 ML | Refills: 0 | Status: SHIPPED | OUTPATIENT
Start: 2022-01-06

## 2022-01-06 RX ORDER — CLINDAMYCIN PALMITATE HYDROCHLORIDE 75 MG/5ML
SOLUTION ORAL
Qty: 300 ML | Refills: 0 | Status: SHIPPED | OUTPATIENT
Start: 2022-01-06

## 2022-01-06 RX ORDER — ECHINACEA PURPUREA EXTRACT 125 MG
2 TABLET ORAL
Qty: 480 ML | Refills: 12 | Status: SHIPPED | OUTPATIENT
Start: 2022-01-06

## 2022-01-06 RX ORDER — OXYMETAZOLINE HYDROCHLORIDE 0.05 G/100ML
2 SPRAY NASAL 3 TIMES DAILY
Qty: 30 ML | Refills: 0 | Status: SHIPPED | OUTPATIENT
Start: 2022-01-06 | End: 2022-02-25

## 2022-01-06 RX ORDER — ECHINACEA PURPUREA EXTRACT 125 MG
2 TABLET ORAL CONTINUOUS PRN
Qty: 480 ML | Refills: 12 | Status: SHIPPED | OUTPATIENT
Start: 2022-01-06

## 2022-01-06 RX ADMIN — DEXAMETHASONE SODIUM PHOSPHATE 8 MG: 4 INJECTION, SOLUTION INTRA-ARTICULAR; INTRALESIONAL; INTRAMUSCULAR; INTRAVENOUS; SOFT TISSUE at 05:07

## 2022-01-06 RX ADMIN — SALINE NASAL SPRAY 2 SPRAY: 1.5 SOLUTION NASAL at 06:39

## 2022-01-06 RX ADMIN — CLINDAMYCIN IN 5 PERCENT DEXTROSE 600 MG: 12 INJECTION, SOLUTION INTRAVENOUS at 05:07

## 2022-01-06 RX ADMIN — OXYMETAZOLINE HYDROCHLORIDE 2 SPRAY: 5 SPRAY NASAL at 09:49

## 2022-01-06 NOTE — PLAN OF CARE
Goal Outcome Evaluation:  Plan of Care Reviewed With: patient        Progress: no change  Outcome Summary: VSS. No c/o pain. Pt slept well throughout shift. IV fluids infusing. IV abx administered. Oral care. Safety maintained. Will continue to monitor.

## 2022-01-06 NOTE — H&P
Henry Austin Jr, MD  [facility] EAR NOSE & THROAT  [address], [address]  [place] [address]  Fax [phone]  Phone [phone]       Visit Type: NEW PATIENT       Chief Complaint   Patient presents with   • peritonsilar abscess         Subjective      HPI   Accompanied by:  No ne     No surgery found, No surgery found   She complains of throat sweeling. Has been referred for PTA from [place].  R sided throat pain. R ear karoline. R headache. She says MD looked at PTA and was told PTA.  She cannot eat. When she swallows, she says comes out nose.  Seen in [facility] earlier this AM.  Smoke- 1/2 ppd, not quitting, x 30 yrs  Drink- none  Has had a history of strep throat              Objective []Expand by Default     Vital Signs:   Temp:  [98.2 °F (36.8 °C)] 98.2 °F (36.8 °C)  Heart Rate:  [112] 112  BP: (162)/(88) 162/88  ENT Physical Exam  Constitutional  Appearance: patient appears well-developed and well-nourished,  Communication/Voice: Communication comments: Hot potato  Head and Face  Appearance: head appears normal, face appears normal and face appears atraumatic;  Salivary: glands normal;  Head and Face comments: Tender R face into scalp and neck  Ear  Hearing: intact;  Auricles: right auricle normal; left auricle normal;  External Mastoids: right external mastoid normal; left external mastoid normal;  Ear Canals: right ear canal normal; left ear canal normal;  Tympanic Membranes: right tympanic membrane normal; left tympanic membrane normal;  Nose  External Nose: nares patent bilaterally; external nose normal;  Internal Nose: nasal mucosa normal; septum normal; bilateral inferior turbinates normal;  Oral Cavity/Oropharynx  Lips: normal;  Teeth: trismus (moderate to severe) noted;  Gums: gingiva normal;  Tongue: normal;  Oral mucosa: normal;  Hard palate: normal;  Soft palate: with mass (Bulging R side) present;  OC/OP comments: Trismus  present  Neck  Thyroid: thyroid normal;  Neck comments: Tender R neck at angle area and submandibular  Respiratory  Inspection: breathing unlabored; normal breathing rate;  Auscultation: breath sounds are clear;  Cardiovascular  Inspection: extremities are warm and well perfused; no peripheral edema present;  Neurovestibular  Mental Status: alert and oriented;  Psychiatric: mood normal; affect is appropriate;  Cranial Nerves: cranial nerves intact;  Drawings                 Result Review       RESULTS REVIEW    I have reviewed the patients old records in the chart.   I have reviewed the patients old records in the chart.  The following results/records were reviewed:  I reviewed the patient's new imaging results and agree with the interpretation.   Referral to Otolaryngology for Peritonsillar abscess (01/05/2022)  EXTERNAL MEDICAL RECORDS - SCAN - EXT MED REC 2_FERNANDO PINTO MD_1/5/22 (01/05/2022)  EXTERNAL MEDICAL RECORDS - SCAN - EXT MED REC_FERNANDO PINTO MD_1/5/22 (01/05/2022)                 Assessment/Plan       Diagnoses and all orders for this visit:     1. Peritonsillar abscess (Primary)  Comments:  right     2. Acute tonsillitis, unspecified etiology  Comments:  R>L     3. Personal history of nicotine dependence  Comments:  Counseled cessation     4. Anatomic airway obstruction  Comments:  R tonsil         Medical and surgical options were discussed including observation, continued medical management, medication modification, surgical management and admission to hospital. Risks, benefits and alternatives were discussed and questions were answered. After considering the options, the patient decided to proceed with admission to hospital, IV steroids, pain control, tonsillectomy.        Patient Instructions     Patient has right peritonsillar abscess.  I will admit her to the hospital for IV hydration, resuscitation, IV steroids, IV antibiotics.  I will plan incision and drainage in the next few hours if she does  not defervesce.  She may require tonsillectomy.  Admission to hospital  IV resuscitation     Encouraged to enroll in My Chart  Encouraged to review data and findings in My Chart     Patient understand(s) and agree(s) with the treatment plan as described.         Return Return to clinic after hospitalization, for Recheck tonsils.      Henry Austin Jr, MD  01/05/22  13:44 CST

## 2022-01-06 NOTE — DISCHARGE SUMMARY
[facility] Otolaryngology Head and Neck Surgery  DISCHARGE SUMMARY    Patient Name: Milagros Daniels  : 1980  ACCOUNT NUMBER: [id number]  ADMISSION DATE:  2022  DISCHARGE DATE:  2022   ATTENDING PHYSICIAN: Henry Austin Jr*  PRIMARY CARE PHYSICIAN: Provider, No Known  CONDITION ON DISCHARGE: stable    ADMITTING DIAGNOSIS:   Present on Admission:  **None**    PRESENTING PROBLEM:   Peritonsillar abscess [J36]  Acute tonsillitis, unspecified etiology [J03.90]  Anatomic airway obstruction [J98.8]    Consults     No orders found for last 30 day(s).          Surgical Procedures Since Admission:  Procedure(s):  PERITONSILLAR ABSCESS INCISION AND DRAINAGE  TONSILLECTOMY AND ADENOIDECTOMY  Surgeon:  Henry Austin Jr., MD  Status:  1 Day Post-Op  -------------------      Active and Resolved Hospital Problems:  Active Hospital Problems    Diagnosis POA   • Peritonsillar abscess [J36] Unknown   • Acute tonsillitis [J03.90] Unknown   • Anatomic airway obstruction [J98.8] Unknown   • S/P tonsillectomy [Z90.89] Not Applicable      Resolved Hospital Problems   No resolved problems to display.       ACCOMPANIED BY: No one  Hospital Course   Hospital Course:  Milagros Daniels is a 41 y.o. female status post incision and drainage of peritonsillar abscess, tonsillectomy.  She is sleeping when I come in the room.  She is easily awakened.  She feels much better.  She has expected tonsillar pain.  She has had no fever, no other issues overnight.  She wishes to go home this morning.  Patient is discharged home with postoperative tonsillectomy instructions.    Day of Discharge   Vital Signs:  Temp:  [97.5 °F (36.4 °C)-100.1 °F (37.8 °C)] 98.6 °F (37 °C)  Heart Rate:  [] 97  Resp:  [14-20] 18  BP: (114-162)/(63-88) 114/63  Flow (L/min):  [8] 8  Output by Drain (mL) 22 0701 - 22 1900 22 1901 - 22 0700 22 0701 - 22 0746 Range Total    Patient has no LDAs of requested type attached.     Physical Exam  Vitals reviewed.   Constitutional:       Appearance: Normal appearance. She is well-developed and normal weight.      Comments: Awakens easily, no snoring noted while sleeping   HENT:      Head: Normocephalic and atraumatic.      Right Ear: External ear normal.      Left Ear: External ear normal.      Nose: Nose normal.      Mouth/Throat:      Lips: Pink.      Mouth: Mucous membranes are dry.      Dentition: Normal dentition. No gum lesions.      Tonsils: 0 on the right. 0 on the left.      Comments: Tonsillar fossa-with expected postoperative appearance, no active bleeding    Hot potato voice markedly improved  Eyes:      General: Lids are normal. Gaze aligned appropriately.      Extraocular Movements: Extraocular movements intact.      Conjunctiva/sclera: Conjunctivae normal.   Neck:      Thyroid: No thyroid mass or thyromegaly.      Comments: Tender in the [place]ANT[place] area  Cardiovascular:      Rate and Rhythm: Normal rate and regular rhythm.   Pulmonary:      Effort: Pulmonary effort is normal. No tachypnea or respiratory distress.      Breath sounds: No stridor.   Musculoskeletal:         General: Normal range of motion.      Cervical back: Normal range of motion. Pain with movement present.   Lymphadenopathy:      Cervical: No cervical adenopathy.   Skin:     Findings: No rash.   Neurological:      General: No focal deficit present.      Mental Status: She is alert, oriented to person, place, and time and easily aroused.      Cranial Nerves: Cranial nerves are intact. No cranial nerve deficit.   Psychiatric:         Attention and Perception: Attention and perception normal.         Mood and Affect: Mood and affect normal.         Behavior: Behavior normal. Behavior is cooperative.         Thought Content: Thought content normal.         Judgment: Judgment normal.      Wound Culture - Wound, Tonsil, Right (01/05/2022 16:32)   Tissue Pathology Exam  (01/05/2022 16:40)       Pertinent  and/or Most Recent Results   LAB RESULTS:                 URINALYSIS@  Microbiology Results (last 10 days)     Procedure Component Value - Date/Time    Wound Culture - Wound, Tonsil, Right [[id number]] Collected: 01/05/22 1632    Lab Status: Preliminary result Specimen: Wound from Tonsil, Right Updated: 01/06/22 0637     Gram Stain Rare (1+) WBCs per low power field      Rare (1+) Gram negative bacilli      Rare (1+) Gram positive cocci           Labs Pending at Discharge:   Pending Labs     Order Current Status    Tissue Pathology Exam Collected (01/05/22 1640)    Wound Culture - Wound, Tonsil, Right Preliminary result          Discharge Details        Discharge Medications      New Medications      Instructions Start Date   clindamycin 75 MG/5ML solution  Commonly known as: CLEOCIN   10 ml tid x 7 days      oxyCODONE 5 MG/5ML solution  Commonly known as: ROXICODONE   5 mg, Oral, Every 4 Hours PRN      oxymetazoline 0.05 % nasal spray  Commonly known as: AFRIN   2 sprays, Nasal, 3 Times Daily         Changes to Medications      Instructions Start Date   sodium chloride 0.65 % nasal spray  What changed: Another medication with the same name was added. Make sure you understand how and when to take each.   1 spray, Nasal, As Needed      sodium chloride 0.65 % nasal spray  What changed: You were already taking a medication with the same name, and this prescription was added. Make sure you understand how and when to take each.   2 sprays, Nasal, 5 Times Daily      sodium chloride 0.65 % nasal spray  What changed: You were already taking a medication with the same name, and this prescription was added. Make sure you understand how and when to take each.   2 sprays, Nasal, Continuous PRN         Continue These Medications      Instructions Start Date   Dee-[place] Pls Sinus & Cough 10-5-325 MG capsule  Generic drug: DM-Phenylephrine-Acetaminophen   Oral      naproxen sodium 220 MG  tablet  Commonly known as: ALEVE   220 mg, Oral, 2 Times Daily PRN             No Known Allergies    Discharge Disposition:   Home or Self Care    Diet:   Hospital:  Diet Order   Procedures   • Diet Adult T & A   Advance as tolerated    Discharge Activity:   Activity Instructions     Cool & Quiet Activity for 2 Weeks      No Strenuous Activity for 2 Weeks            Future appointments: Follow-up with ENT 1 month    Additional Instructions for the Follow-ups that You Need to Schedule     Elevate HOB at Least 30 Degrees   As directed            Discharge discussion was held with the Patient.  Discharge instructions and discharge medications were reviewed with the Patient.  The Patient expresses understanding of the discharge plan.  Follow-up has been arranged.  The Patient has been instructed to call for any problems or questions.    FOLLOW UP:  Follow up in 3-4 weeks with Dr. Henry Austin MD    Time spent on Discharge including face to face service: 37 minutes    Electronically signed by Henry Austin Jr, MD, 01/06/22, 7:46 AM CST.

## 2022-01-07 LAB
BACTERIA SPEC AEROBE CULT: ABNORMAL
CYTO UR: NORMAL
GRAM STN SPEC: ABNORMAL
LAB AP CASE REPORT: NORMAL
PATH REPORT.FINAL DX SPEC: NORMAL
PATH REPORT.GROSS SPEC: NORMAL

## 2022-01-24 ENCOUNTER — TELEPHONE (OUTPATIENT)
Dept: OTOLARYNGOLOGY | Facility: CLINIC | Age: 42
End: 2022-01-24

## 2022-01-24 NOTE — TELEPHONE ENCOUNTER
Caller: LILIYA MCBRIDE    Relationship: SELF    Best call back number: [phone]    What form or medical record are you requesting: RETURN TO WORK    Who is requesting this form or medical record from you: EMPLOYER    How would you like to receive the form or medical records (pick-up, mail, fax): FAX  If fax, what is the fax number: [phone]  If mail, what is the address:   If pick-up, provide patient with address and location details    Timeframe paperwork needed: ASAP    Additional notes: PT NEEDS PAPERWORK FILLED OUT AND SENT BACK TO RETURN TO WORK. EMPLOYER WILL BE FAXING OVER TO PRACTICE SHORTLY.     VERIFY WITH PHONE CALL TO PT SO SHE KNOWS IT'S BEEN SENT BACK SO SHE CAN GO BACK TO WORK.     THANKS!

## 2022-05-17 NOTE — ANESTHESIA PREPROCEDURE EVALUATION
Anesthesia Evaluation     Patient summary reviewed   no history of anesthetic complications:  NPO Solid Status: > 8 hours             Airway   Mallampati: II  Neck ROM: full  Small opening  Dental      Pulmonary    (+) a smoker,   (-) COPD, asthma, sleep apnea  Cardiovascular   Exercise tolerance: excellent (>7 METS)    (-) pacemaker, past MI, angina, cardiac stents      Neuro/Psych  (-) seizures, TIA, CVA  GI/Hepatic/Renal/Endo    (+)   diabetes mellitus,   (-) GERD, liver disease, no renal disease    Musculoskeletal     Abdominal    Substance History      OB/GYN          Other                        Anesthesia Plan    ASA 2 - emergent     general     intravenous induction     Anesthetic plan, all risks, benefits, and alternatives have been provided, discussed and informed consent has been obtained with: patient.      
Admission

## 2022-06-29 ENCOUNTER — HOSPITAL ENCOUNTER (EMERGENCY)
Facility: HOSPITAL | Age: 42
Discharge: LEFT BEFORE DISCHARGE | End: 2022-06-29
Admitting: STUDENT IN AN ORGANIZED HEALTH CARE EDUCATION/TRAINING PROGRAM

## 2022-06-29 ENCOUNTER — APPOINTMENT (OUTPATIENT)
Dept: CT IMAGING | Facility: HOSPITAL | Age: 42
End: 2022-06-29

## 2022-06-29 ENCOUNTER — APPOINTMENT (OUTPATIENT)
Dept: GENERAL RADIOLOGY | Facility: HOSPITAL | Age: 42
End: 2022-06-29

## 2022-06-29 ENCOUNTER — APPOINTMENT (OUTPATIENT)
Dept: MRI IMAGING | Facility: HOSPITAL | Age: 42
End: 2022-06-29

## 2022-06-29 VITALS
DIASTOLIC BLOOD PRESSURE: 85 MMHG | WEIGHT: 135 LBS | BODY MASS INDEX: 25.49 KG/M2 | HEART RATE: 79 BPM | TEMPERATURE: 99.2 F | HEIGHT: 61 IN | SYSTOLIC BLOOD PRESSURE: 122 MMHG | RESPIRATION RATE: 15 BRPM | OXYGEN SATURATION: 100 %

## 2022-06-29 DIAGNOSIS — Z53.29 LEFT AGAINST MEDICAL ADVICE: Primary | ICD-10-CM

## 2022-06-29 LAB
ALBUMIN SERPL-MCNC: 3.4 G/DL (ref 3.5–5.2)
ALBUMIN/GLOB SERPL: 1.3 G/DL
ALP SERPL-CCNC: 92 U/L (ref 39–117)
ALT SERPL W P-5'-P-CCNC: 6 U/L (ref 1–33)
AMPHET+METHAMPHET UR QL: POSITIVE
AMPHETAMINES UR QL: POSITIVE
ANION GAP SERPL CALCULATED.3IONS-SCNC: 7 MMOL/L (ref 5–15)
APAP SERPL-MCNC: <5 MCG/ML (ref 0–30)
AST SERPL-CCNC: 7 U/L (ref 1–32)
BARBITURATES UR QL SCN: NEGATIVE
BASOPHILS # BLD AUTO: 0.06 10*3/MM3 (ref 0–0.2)
BASOPHILS NFR BLD AUTO: 0.8 % (ref 0–1.5)
BENZODIAZ UR QL SCN: NEGATIVE
BILIRUB SERPL-MCNC: <0.2 MG/DL (ref 0–1.2)
BILIRUB UR QL STRIP: NEGATIVE
BUN SERPL-MCNC: 6 MG/DL (ref 6–20)
BUN/CREAT SERPL: 9.4 (ref 7–25)
BUPRENORPHINE SERPL-MCNC: NEGATIVE NG/ML
CALCIUM SPEC-SCNC: 8.7 MG/DL (ref 8.6–10.5)
CANNABINOIDS SERPL QL: POSITIVE
CHLORIDE SERPL-SCNC: 103 MMOL/L (ref 98–107)
CLARITY UR: CLEAR
CO2 SERPL-SCNC: 28 MMOL/L (ref 22–29)
COCAINE UR QL: POSITIVE
COLOR UR: YELLOW
CREAT SERPL-MCNC: 0.64 MG/DL (ref 0.57–1)
CRP SERPL-MCNC: 0.37 MG/DL (ref 0–0.5)
D-LACTATE SERPL-SCNC: 1.5 MMOL/L (ref 0.5–2)
DEPRECATED RDW RBC AUTO: 46.4 FL (ref 37–54)
EGFRCR SERPLBLD CKD-EPI 2021: 111.9 ML/MIN/1.73
EOSINOPHIL # BLD AUTO: 0.14 10*3/MM3 (ref 0–0.4)
EOSINOPHIL NFR BLD AUTO: 1.9 % (ref 0.3–6.2)
ERYTHROCYTE [DISTWIDTH] IN BLOOD BY AUTOMATED COUNT: 15.3 % (ref 12.3–15.4)
ETHANOL UR QL: <0.01 %
GLOBULIN UR ELPH-MCNC: 2.6 GM/DL
GLUCOSE BLDC GLUCOMTR-MCNC: 289 MG/DL (ref 70–130)
GLUCOSE SERPL-MCNC: 269 MG/DL (ref 65–99)
GLUCOSE UR STRIP-MCNC: ABNORMAL MG/DL
HCT VFR BLD AUTO: 39.2 % (ref 34–46.6)
HGB BLD-MCNC: 12.9 G/DL (ref 12–15.9)
HGB UR QL STRIP.AUTO: NEGATIVE
IMM GRANULOCYTES # BLD AUTO: 0.02 10*3/MM3 (ref 0–0.05)
IMM GRANULOCYTES NFR BLD AUTO: 0.3 % (ref 0–0.5)
INR PPP: 1 (ref 0.91–1.09)
KETONES UR QL STRIP: ABNORMAL
LEUKOCYTE ESTERASE UR QL STRIP.AUTO: NEGATIVE
LYMPHOCYTES # BLD AUTO: 2.6 10*3/MM3 (ref 0.7–3.1)
LYMPHOCYTES NFR BLD AUTO: 35.9 % (ref 19.6–45.3)
MCH RBC QN AUTO: 27.3 PG (ref 26.6–33)
MCHC RBC AUTO-ENTMCNC: 32.9 G/DL (ref 31.5–35.7)
MCV RBC AUTO: 82.9 FL (ref 79–97)
METHADONE UR QL SCN: NEGATIVE
MONOCYTES # BLD AUTO: 0.43 10*3/MM3 (ref 0.1–0.9)
MONOCYTES NFR BLD AUTO: 5.9 % (ref 5–12)
NEUTROPHILS NFR BLD AUTO: 3.99 10*3/MM3 (ref 1.7–7)
NEUTROPHILS NFR BLD AUTO: 55.2 % (ref 42.7–76)
NITRITE UR QL STRIP: NEGATIVE
NRBC BLD AUTO-RTO: 0 /100 WBC (ref 0–0.2)
OPIATES UR QL: NEGATIVE
OXYCODONE UR QL SCN: NEGATIVE
PCP UR QL SCN: NEGATIVE
PH UR STRIP.AUTO: 6 [PH] (ref 5–8)
PLATELET # BLD AUTO: 316 10*3/MM3 (ref 140–450)
PMV BLD AUTO: 10.2 FL (ref 6–12)
POTASSIUM SERPL-SCNC: 3.4 MMOL/L (ref 3.5–5.2)
PROCALCITONIN SERPL-MCNC: <0.02 NG/ML (ref 0–0.25)
PROPOXYPH UR QL: NEGATIVE
PROT SERPL-MCNC: 6 G/DL (ref 6–8.5)
PROT UR QL STRIP: NEGATIVE
PROTHROMBIN TIME: 12.8 SECONDS (ref 11.9–14.6)
RBC # BLD AUTO: 4.73 10*6/MM3 (ref 3.77–5.28)
SALICYLATES SERPL-MCNC: <0.3 MG/DL
SARS-COV-2 RNA PNL SPEC NAA+PROBE: NOT DETECTED
SODIUM SERPL-SCNC: 138 MMOL/L (ref 136–145)
SP GR UR STRIP: >1.03 (ref 1–1.03)
TRICYCLICS UR QL SCN: NEGATIVE
TROPONIN T SERPL-MCNC: <0.01 NG/ML (ref 0–0.03)
UROBILINOGEN UR QL STRIP: ABNORMAL
WBC NRBC COR # BLD: 7.24 10*3/MM3 (ref 3.4–10.8)

## 2022-06-29 PROCEDURE — 86140 C-REACTIVE PROTEIN: CPT | Performed by: NURSE PRACTITIONER

## 2022-06-29 PROCEDURE — 71045 X-RAY EXAM CHEST 1 VIEW: CPT

## 2022-06-29 PROCEDURE — 80143 DRUG ASSAY ACETAMINOPHEN: CPT | Performed by: NURSE PRACTITIONER

## 2022-06-29 PROCEDURE — 87635 SARS-COV-2 COVID-19 AMP PRB: CPT | Performed by: NURSE PRACTITIONER

## 2022-06-29 PROCEDURE — 85025 COMPLETE CBC W/AUTO DIFF WBC: CPT | Performed by: NURSE PRACTITIONER

## 2022-06-29 PROCEDURE — 70450 CT HEAD/BRAIN W/O DYE: CPT

## 2022-06-29 PROCEDURE — 84145 PROCALCITONIN (PCT): CPT | Performed by: NURSE PRACTITIONER

## 2022-06-29 PROCEDURE — 70551 MRI BRAIN STEM W/O DYE: CPT

## 2022-06-29 PROCEDURE — 81003 URINALYSIS AUTO W/O SCOPE: CPT | Performed by: NURSE PRACTITIONER

## 2022-06-29 PROCEDURE — 85610 PROTHROMBIN TIME: CPT | Performed by: NURSE PRACTITIONER

## 2022-06-29 PROCEDURE — 93005 ELECTROCARDIOGRAM TRACING: CPT | Performed by: NURSE PRACTITIONER

## 2022-06-29 PROCEDURE — 82077 ASSAY SPEC XCP UR&BREATH IA: CPT | Performed by: NURSE PRACTITIONER

## 2022-06-29 PROCEDURE — 83605 ASSAY OF LACTIC ACID: CPT | Performed by: NURSE PRACTITIONER

## 2022-06-29 PROCEDURE — 93010 ELECTROCARDIOGRAM REPORT: CPT | Performed by: INTERNAL MEDICINE

## 2022-06-29 PROCEDURE — 80053 COMPREHEN METABOLIC PANEL: CPT | Performed by: NURSE PRACTITIONER

## 2022-06-29 PROCEDURE — 80306 DRUG TEST PRSMV INSTRMNT: CPT | Performed by: NURSE PRACTITIONER

## 2022-06-29 PROCEDURE — 87040 BLOOD CULTURE FOR BACTERIA: CPT | Performed by: NURSE PRACTITIONER

## 2022-06-29 PROCEDURE — 84484 ASSAY OF TROPONIN QUANT: CPT | Performed by: NURSE PRACTITIONER

## 2022-06-29 PROCEDURE — 80179 DRUG ASSAY SALICYLATE: CPT | Performed by: NURSE PRACTITIONER

## 2022-06-29 PROCEDURE — 99284 EMERGENCY DEPT VISIT MOD MDM: CPT

## 2022-06-29 PROCEDURE — 82962 GLUCOSE BLOOD TEST: CPT

## 2022-06-29 RX ORDER — SODIUM CHLORIDE 0.9 % (FLUSH) 0.9 %
10 SYRINGE (ML) INJECTION AS NEEDED
Status: DISCONTINUED | OUTPATIENT
Start: 2022-06-29 | End: 2022-06-29 | Stop reason: HOSPADM

## 2022-07-01 LAB
QT INTERVAL: 410 MS
QTC INTERVAL: 429 MS

## 2022-07-04 LAB
BACTERIA SPEC AEROBE CULT: NORMAL
BACTERIA SPEC AEROBE CULT: NORMAL

## 2024-10-08 ENCOUNTER — OFFICE VISIT (OUTPATIENT)
Dept: FAMILY MEDICINE CLINIC | Facility: CLINIC | Age: 44
End: 2024-10-08
Payer: MEDICAID

## 2024-10-08 VITALS
DIASTOLIC BLOOD PRESSURE: 65 MMHG | SYSTOLIC BLOOD PRESSURE: 100 MMHG | RESPIRATION RATE: 18 BRPM | BODY MASS INDEX: 29.64 KG/M2 | HEIGHT: 60 IN | WEIGHT: 151 LBS | TEMPERATURE: 98.2 F | OXYGEN SATURATION: 98 % | HEART RATE: 90 BPM

## 2024-10-08 DIAGNOSIS — J02.9 SORE THROAT: Primary | ICD-10-CM

## 2024-10-08 DIAGNOSIS — H92.09 EAR ACHE: ICD-10-CM

## 2024-10-08 DIAGNOSIS — R09.89 CHEST CONGESTION: ICD-10-CM

## 2024-10-08 DIAGNOSIS — J01.00 ACUTE MAXILLARY SINUSITIS, RECURRENCE NOT SPECIFIED: ICD-10-CM

## 2024-10-08 DIAGNOSIS — R05.9 COUGH, UNSPECIFIED TYPE: ICD-10-CM

## 2024-10-08 LAB
EXPIRATION DATE: NORMAL
EXPIRATION DATE: NORMAL
FLUAV AG UPPER RESP QL IA.RAPID: NOT DETECTED
FLUBV AG UPPER RESP QL IA.RAPID: NOT DETECTED
INTERNAL CONTROL: NORMAL
INTERNAL CONTROL: NORMAL
Lab: NORMAL
Lab: NORMAL
S PYO AG THROAT QL: NEGATIVE
SARS-COV-2 AG UPPER RESP QL IA.RAPID: NOT DETECTED

## 2024-10-08 PROCEDURE — 87880 STREP A ASSAY W/OPTIC: CPT

## 2024-10-08 PROCEDURE — 87428 SARSCOV & INF VIR A&B AG IA: CPT

## 2024-10-08 PROCEDURE — 1159F MED LIST DOCD IN RCRD: CPT

## 2024-10-08 PROCEDURE — 1160F RVW MEDS BY RX/DR IN RCRD: CPT

## 2024-10-08 PROCEDURE — 99203 OFFICE O/P NEW LOW 30 MIN: CPT

## 2024-10-08 RX ORDER — AMITRIPTYLINE HYDROCHLORIDE 50 MG/1
TABLET ORAL
COMMUNITY
Start: 2024-10-04

## 2024-10-08 RX ORDER — DAPAGLIFLOZIN 10 MG/1
TABLET, FILM COATED ORAL
COMMUNITY
Start: 2024-09-09

## 2024-10-08 RX ORDER — ATORVASTATIN CALCIUM 10 MG/1
TABLET, FILM COATED ORAL
COMMUNITY

## 2024-10-08 RX ORDER — DEXTROMETHORPHAN HYDROBROMIDE, BUPROPION HYDROCHLORIDE 105; 45 MG/1; MG/1
1 TABLET, MULTILAYER, EXTENDED RELEASE ORAL 2 TIMES DAILY
COMMUNITY

## 2024-10-08 RX ORDER — GUAIFENESIN 600 MG/1
600 TABLET, EXTENDED RELEASE ORAL 2 TIMES DAILY
Qty: 14 TABLET | Refills: 0 | Status: SHIPPED | OUTPATIENT
Start: 2024-10-08 | End: 2024-10-15

## 2024-10-08 RX ORDER — LISINOPRIL 10 MG/1
TABLET ORAL
COMMUNITY

## 2024-10-08 RX ORDER — VILOXAZINE HYDROCHLORIDE 200 MG/1
1 CAPSULE, EXTENDED RELEASE ORAL DAILY
COMMUNITY
Start: 2024-10-03

## 2024-10-08 RX ORDER — SEMAGLUTIDE 1.34 MG/ML
INJECTION, SOLUTION SUBCUTANEOUS
COMMUNITY
Start: 2024-10-03

## 2024-10-08 RX ORDER — NALTREXONE 380 MG
KIT INTRAMUSCULAR
COMMUNITY
Start: 2024-09-09

## 2024-10-08 RX ORDER — METHYLPREDNISOLONE 4 MG
TABLET, DOSE PACK ORAL
Qty: 21 TABLET | Refills: 0 | Status: SHIPPED | OUTPATIENT
Start: 2024-10-08

## 2024-10-08 RX ORDER — BUSPIRONE HYDROCHLORIDE 10 MG/1
1 TABLET ORAL 3 TIMES DAILY
COMMUNITY

## 2024-10-08 NOTE — LETTER
October 8, 2024     Patient: Milagros Daniels   YOB: 1980   Date of Visit: 10/8/2024       To Whom It May Concern:    Please excuse Milagros Teechase  for 10/7/24 and 10/8/24 .          Sincerely,        LANA Ambrose

## 2024-10-08 NOTE — PROGRESS NOTES
Chief Complaint  Cough, URI, Sore Throat, and Earache    Subjective        Milagros Daniels presents to [facility]  Cough  This is a new problem. The current episode started yesterday. The problem has been worse. The problem occurs hourly. The cough is Rattling. Associated symptoms include ear pain, nasal congestion, postnasal drip, rhinorrhea and a sore throat. Pertinent negatives include no chest pain or shortness of breath. The symptoms are aggravated by lying down. She has tried nothing for the symptoms.   URI   This is a new problem. The current episode started yesterday. The problem has been gradually worsening. There has been no fever. Associated symptoms include congestion, coughing, ear pain, rhinorrhea and a sore throat. Pertinent negatives include no chest pain. She has tried nothing for the symptoms.     Patient is a 44-year-old female presenting today with complaints of cough, congestion, earache, and chest discomfort. Symptoms started yesterday. No known fever but has had chills. Chest discomfort is described as feeling like need to cough up something.     Past Medical History:   Diagnosis Date    Diabetes mellitus     TYPE 2    Vertigo      Past Surgical History:   Procedure Laterality Date    APPENDECTOMY       SECTION      x 2    COLONOSCOPY      LAPAROSCOPIC TUBAL LIGATION      PERITONSILLAR ABSCESS INCISION AND DRAINAGE Right 2022    Procedure: PERITONSILLAR ABSCESS INCISION AND DRAINAGE;  Surgeon: Henry Austin Jr., MD;  Location: [facility] OR;  Service: ENT;  Laterality: Right;    TOE SURGERY      TONSILLECTOMY AND ADENOIDECTOMY Bilateral 2022    Procedure: TONSILLECTOMY AND ADENOIDECTOMY;  Surgeon: Henry Austin Jr., MD;  Location: [facility] OR;  Service: ENT;  Laterality: Bilateral;    WISDOM TOOTH EXTRACTION       Social History     Socioeconomic History    Marital status:    Tobacco Use    Smoking status: Every Day      Current packs/day: 0.50     Types: Cigarettes    Smokeless tobacco: Never   Vaping Use    Vaping status: Never Used   Substance and Sexual Activity    Alcohol use: Not Currently    Drug use: Not Currently    Sexual activity: Defer     History reviewed. No pertinent family history.    Medications:  Prior to Admission medications    Medication Sig Start Date End Date Taking? Authorizing Provider   amitriptyline (ELAVIL) 50 MG tablet  10/4/24  Yes Katerine Smith MD   atorvastatin (LIPITOR) 10 MG tablet    Yes ProviderKaterine MD   Auvelity  MG tablet controlled-release Take 1 tablet by mouth 2 (Two) Times a Day. as directed   Yes Katerine Smith MD   busPIRone (BUSPAR) 10 MG tablet Take 1 tablet by mouth 3 (Three) Times a Day.   Yes Katerine Smith MD   DM-Phenylephrine-Acetaminophen (Dee-[place] Pls Sinus & Cough) 10-5-325 MG capsule Take  by mouth.   Yes Katerine Smith MD   Farxiga 10 MG tablet  9/9/24  Yes Katerine Smith MD   lisinopril (PRINIVIL,ZESTRIL) 10 MG tablet    Yes ProviderKaterine MD   metFORMIN (GLUCOPHAGE) 500 MG tablet    Yes Provider, MD Katerine   omeprazole (priLOSEC) 20 MG capsule  6/21/24  Yes ProviderKaterine MD   Ozempic, 1 MG/DOSE, 4 MG/3ML solution pen-injector INJECT 1 MG SUBCUTANEOUSLY EVERY WEEK 10/3/24  Yes Ktaerine Smith MD   Qelbree 200 MG capsule sustained-release 24 hr Take 1 capsule by mouth Daily. 10/3/24  Yes Katerine Smith MD   Vivitrol 380 MG reconstituted suspension IM suspension  9/9/24  Yes ProviderKaterine MD   clindamycin (CLEOCIN) 75 MG/5ML solution 10 ml tid x 7 days  Patient not taking: Reported on 10/8/2024 1/6/22   Henry Austin Jr., MD   naproxen sodium (ALEVE) 220 MG tablet Take 220 mg by mouth 2 (Two) Times a Day As Needed.  Patient not taking: Reported on 10/8/2024    Katerine Smith MD   oxyCODONE (ROXICODONE) 5 MG/5ML solution Take 5 mL by mouth Every 4 (Four) Hours As Needed  "for Moderate Pain .  Patient not taking: Reported on 10/8/2024 1/6/22   Henry Austin Jr., MD   sodium chloride 0.65 % nasal spray 1 spray into the nostril(s) as directed by provider As Needed for Congestion.  Patient not taking: Reported on 10/8/2024    Provider, MD Katerine   sodium chloride 0.65 % nasal spray Use 2 sprays into the nostril(s) as directed by provider 5 (Five) Times a Day.  Patient not taking: Reported on 10/8/2024 1/6/22   Henry Austin Jr., MD   sodium chloride 0.65 % nasal spray 2 sprays into the nostril(s) as directed by provider Continuous As Needed for Congestion (as desired frequently).  Patient not taking: Reported on 10/8/2024 1/6/22   Henry Austin Jr., MD       Review of Systems   HENT:  Positive for congestion, ear pain, postnasal drip, rhinorrhea and sore throat.    Respiratory:  Positive for cough. Negative for shortness of breath.    Cardiovascular:  Negative for chest pain.     Review of systems is negative unless otherwise specified in HPI.    Objective   Vital Signs:  /65 (BP Location: Right arm, Patient Position: Sitting, Cuff Size: Adult)   Pulse 90   Temp 98.2 °F (36.8 °C) (Infrared)   Resp 18   Ht 152.4 cm (60\")   Wt 68.5 kg (151 lb)   SpO2 98%   BMI 29.49 kg/m²   Estimated body mass index is 29.49 kg/m² as calculated from the following:    Height as of this encounter: 152.4 cm (60\").    Weight as of this encounter: 68.5 kg (151 lb).       BMI is >= 25 and <30. (Overweight) The following options were offered after discussion;: weight loss educational material (shared in after visit summary)    PHQ-9 Depression Screening  Little interest or pleasure in doing things? 0-->not at all   Feeling down, depressed, or hopeless? 0-->not at all   Trouble falling or staying asleep, or sleeping too much?     Feeling tired or having little energy?     Poor appetite or overeating?     Feeling bad about yourself - or that you are a failure or have let " yourself or your family down?     Trouble concentrating on things, such as reading the newspaper or watching television?     Moving or speaking so slowly that other people could have noticed? Or the opposite - being so fidgety or restless that you have been moving around a lot more than usual?     Thoughts that you would be better off dead, or of hurting yourself in some way?     PHQ-9 Total Score 0   If you checked off any problems, how difficult have these problems made it for you to do your work, take care of things at home, or get along with other people?           Physical Exam  Vitals and nursing note reviewed.   Constitutional:       General: She is not in acute distress.     Appearance: She is ill-appearing.   HENT:      Head: Normocephalic.      Right Ear: Tympanic membrane normal.      Left Ear: Tympanic membrane is erythematous.      Nose: Nose normal.      Mouth/Throat:      Mouth: Mucous membranes are moist.      Pharynx: Posterior oropharyngeal erythema (PND) present.   Eyes:      Pupils: Pupils are equal, round, and reactive to light.   Cardiovascular:      Rate and Rhythm: Normal rate and regular rhythm.      Pulses: Normal pulses.      Heart sounds: Normal heart sounds.   Pulmonary:      Effort: Pulmonary effort is normal. No respiratory distress.      Breath sounds: Normal breath sounds.   Musculoskeletal:         General: Normal range of motion.      Cervical back: Normal range of motion. Tenderness present.   Lymphadenopathy:      Cervical: Cervical adenopathy present.   Skin:     General: Skin is warm and dry.      Capillary Refill: Capillary refill takes less than 2 seconds.   Neurological:      General: No focal deficit present.      Mental Status: She is alert.        Result Review :  The following data was reviewed by: LANA Ambrose on 10/08/2024:  SARS Antigen   Date Value Ref Range Status   10/08/2024 Not Detected Not Detected, Presumptive Negative Final     Influenza A Antigen EDE    Date Value Ref Range Status   10/08/2024 Not Detected Not Detected Final     Influenza B Antigen EDE   Date Value Ref Range Status   10/08/2024 Not Detected Not Detected Final     Rapid Strep A Screen   Date Value Ref Range Status   10/08/2024 Negative Negative, VALID, INVALID, Not Performed Final                Assessment and Plan   Diagnoses and all orders for this visit:    1. Sore throat (Primary)  -     POCT SARS-CoV-2 Antigen EDE + Flu  -     POCT rapid strep A  -     methylPREDNISolone (MEDROL) 4 MG dose pack; Take as directed on package instructions.  Dispense: 21 tablet; Refill: 0    2. Ear ache  -     POCT SARS-CoV-2 Antigen EDE + Flu  -     POCT rapid strep A    3. Chest congestion  -     POCT SARS-CoV-2 Antigen EDE + Flu  -     POCT rapid strep A  -     guaiFENesin (Mucinex) 600 MG 12 hr tablet; Take 1 tablet by mouth 2 (Two) Times a Day for 7 days.  Dispense: 14 tablet; Refill: 0    4. Cough, unspecified type  -     POCT SARS-CoV-2 Antigen EDE + Flu  -     POCT rapid strep A  -     guaiFENesin (Mucinex) 600 MG 12 hr tablet; Take 1 tablet by mouth 2 (Two) Times a Day for 7 days.  Dispense: 14 tablet; Refill: 0  -     methylPREDNISolone (MEDROL) 4 MG dose pack; Take as directed on package instructions.  Dispense: 21 tablet; Refill: 0    5. Acute maxillary sinusitis, recurrence not specified  -     amoxicillin-clavulanate (AUGMENTIN) 875-125 MG per tablet; Take 1 tablet by mouth 2 (Two) Times a Day for 10 days.  Dispense: 20 tablet; Refill: 0      - treat with antibiotic for sinusitis. With chest congestion and cough will start on Mucinex.         Follow Up   Return if symptoms worsen or fail to improve.  Patient was given instructions and counseling regarding her condition or for health maintenance advice. Please see specific information pulled into the AVS if appropriate.     Signed by:    LANA Ambrose Date: 10/17/24

## (undated) DEVICE — TUBING, SUCTION, 1/4" X 12', STRAIGHT: Brand: MEDLINE

## (undated) DEVICE — GLV SURG BIOGEL M LTX PF 7 1/2

## (undated) DEVICE — IRRIGATOR BULB ASEPTO 60CC STRL

## (undated) DEVICE — BLD MYRNGTMY BEAVR LANCE/DWN/CUT NRW 45D

## (undated) DEVICE — STERILE COTTON BALLS LARGE 5/P: Brand: MEDLINE

## (undated) DEVICE — SURGICAL SUCTION CONNECTING TUBE WITH MALE CONNECTOR AND SUCTION CLAMP, 2 FT. LONG (.6 M), 5 MM I.D.: Brand: CONMED

## (undated) DEVICE — WIPE MEROCEL 3.625X3IN

## (undated) DEVICE — PATIENT RETURN ELECTRODE, SINGLE-USE, CONTACT QUALITY MONITORING, ADULT, WITH 9FT CORD, FOR PATIENTS WEIGING OVER 33LBS. (15KG): Brand: MEGADYNE

## (undated) DEVICE — SPONGE,TONSIL,DBL STRNG,XRAY,MED,1",STRL: Brand: MEDLINE INDUSTRIES, INC.

## (undated) DEVICE — PK TURNOVER RM ADV

## (undated) DEVICE — PLASMABLADE PS300-004 SUCT COAG BLA 16PK: Brand: PLASMABLADE™

## (undated) DEVICE — PAD T&A PACK: Brand: MEDLINE INDUSTRIES, INC.

## (undated) DEVICE — TOWEL,OR,DSP,ST,BLUE,STD,4/PK,20PK/CS: Brand: MEDLINE